# Patient Record
Sex: MALE | Race: WHITE | URBAN - METROPOLITAN AREA
[De-identification: names, ages, dates, MRNs, and addresses within clinical notes are randomized per-mention and may not be internally consistent; named-entity substitution may affect disease eponyms.]

---

## 2017-01-03 ENCOUNTER — APPOINTMENT (OUTPATIENT)
Dept: URBAN - METROPOLITAN AREA SURGERY 9 | Age: 22
Setting detail: DERMATOLOGY
End: 2017-01-03

## 2017-01-03 DIAGNOSIS — L70.0 ACNE VULGARIS: ICD-10-CM

## 2017-01-03 DIAGNOSIS — Z79.899 OTHER LONG TERM (CURRENT) DRUG THERAPY: ICD-10-CM

## 2017-01-03 PROCEDURE — OTHER PRESCRIPTION: OTHER

## 2017-01-03 PROCEDURE — OTHER HIGH RISK MEDICATION MONITORING: OTHER

## 2017-01-03 PROCEDURE — 99214 OFFICE O/P EST MOD 30 MIN: CPT

## 2017-01-03 PROCEDURE — OTHER COUNSELING: OTHER

## 2017-01-03 PROCEDURE — OTHER OTHER: OTHER

## 2017-01-03 PROCEDURE — OTHER TREATMENT REGIMEN: OTHER

## 2017-01-03 RX ORDER — ISOTRETINOIN 30 MG/1
CAPSULE, LIQUID FILLED ORAL
Qty: 60 | Refills: 0 | Status: CANCELLED

## 2017-01-03 NOTE — PROCEDURE: OTHER
Detail Level: Simple
Other (Free Text): Still has a couple cysts but is leaving the [place] so we cannot lengthen his course. Long term expectations reviewed. He is still happy with results.
Note Text (......Xxx Chief Complaint.): This diagnosis correlates with the

## 2017-01-03 NOTE — PROCEDURE: TREATMENT REGIMEN
Plan: Pt has eye appointment later today. Pt will keep this appt and call me when finished to update me on their assessment/recommendations. Blood work was completed this morning, we will call the lab to obtain bw.\\nHe leaves for [place] tomorrow night so we will try to get his RF to him ASA.\\n\\nAddendum: I spoke with his optometrist (Hallie Vitale) at 4:45pm and she said he just has some dryness, no reason he cannot continue isotretinoin
Detail Level: Simple
Continue Regimen: Claravis 30mg twice daily

## 2017-02-03 ENCOUNTER — RX ONLY (RX ONLY)
Age: 22
End: 2017-02-03

## 2017-02-03 RX ORDER — ISOTRETINOIN 30 MG/1
CAPSULE, LIQUID FILLED ORAL
Qty: 60 | Refills: 0 | Status: ERX | COMMUNITY
Start: 2017-02-03